# Patient Record
Sex: FEMALE | Race: WHITE | URBAN - METROPOLITAN AREA
[De-identification: names, ages, dates, MRNs, and addresses within clinical notes are randomized per-mention and may not be internally consistent; named-entity substitution may affect disease eponyms.]

---

## 2017-05-03 ENCOUNTER — OFFICE VISIT (OUTPATIENT)
Dept: ORTHOPEDIC SURGERY | Facility: CLINIC | Age: 72
End: 2017-05-03

## 2017-05-03 VITALS
HEART RATE: 67 BPM | BODY MASS INDEX: 27.49 KG/M2 | TEMPERATURE: 97.2 F | WEIGHT: 165 LBS | DIASTOLIC BLOOD PRESSURE: 60 MMHG | HEIGHT: 65 IN | SYSTOLIC BLOOD PRESSURE: 115 MMHG

## 2017-05-03 DIAGNOSIS — Z96.652 STATUS POST TOTAL LEFT KNEE REPLACEMENT: Primary | ICD-10-CM

## 2017-05-03 RX ORDER — WARFARIN 2.5 MG/1
TABLET ORAL
COMMUNITY
Start: 2017-04-05

## 2017-05-03 NOTE — MR AVS SNAPSHOT
Visit Information Date & Time Provider Department Dept. Phone Encounter #  
 5/3/2017  1:00 PM Musa Rockwell, [address] [facility] [facility] [address] [facility] [phone] Upcoming Health Maintenance Date Due Hepatitis C Screening 1945 DTaP/Tdap/Td series (1 - Tdap) 2/13/1968 BREAST CANCER SCRN MAMMOGRAM 2/13/1997 FOBT Q 1 YEAR AGE 50-75 2/13/1997 ZOSTER VACCINE AGE 60> 2/13/2007 GLAUCOMA SCREENING Q2Y 2/13/2012 Pneumococcal 65+ Low/Medium Risk (1 of 2 - PCV13) 2/13/2012 MEDICARE YEARLY EXAM 2/13/2012 INFLUENZA AGE 9 TO ADULT 8/1/2017 Allergies as of 5/3/2017  Review Complete On: 5/3/2017 By: Joel Kelly Severity Noted Reaction Type Reactions Codeine    Not Reported This Time Other Medication    Not Reported This Time Gortex, plastic tape Pcn [Penicillins]    Not Reported This Time Tape [Adhesive]  05/03/2017    Hives Tegaderm Ag Mesh [Silver]    Not Reported This Time Vancomycin    Not Reported This Time Current Immunizations  Never Reviewed No immunizations on file. Not reviewed this visit You Were Diagnosed With   
  
 Codes Comments Status post total left knee replacement    -  Primary ICD-10-CM: C73.489 ICD-9-CM: V43.65 Vitals BP Pulse Temp Height(growth percentile) Weight(growth percentile) BMI  
 115/60 67 97.2 °F (36.2 °C) 5' 5\" (1.651 m) 165 lb (74.8 kg) 27.46 kg/m2 OB Status Smoking Status Hysterectomy Never Smoker Vitals History BMI and BSA Data Body Mass Index Body Surface Area  
 27.46 kg/m 2 1.85 m 2 Preferred Pharmacy Pharmacy Name Phone [id number] [address] [place] [address] Your Updated Medication List  
  
   
This list is accurate as of: 5/3/17  1:50 PM.  Always use your most recent med list.  
  
  
  
  
 albuterol 90 mcg/actuation inhaler Commonly known as:  Elane Heady Take 2 Puffs by inhalation every six (6) hours as needed. CALCIUM 600 PO Take  by mouth.  
  
 carisoprodol 350 mg tablet Commonly known as:  SOMA Take 1 Tab by mouth three (3) times daily. Prn muscle spasms CENTRUM SILVER PO Take  by mouth. CYMBALTA PO Take  by mouth. LIPITOR PO Take  by mouth.  
  
 promethazine 25 mg tablet Commonly known as:  PHENERGAN Take 1 Tab by mouth every six (6) hours as needed for Nausea for 30 days. PROTONIX PO Take  by mouth. SINGULAIR PO Take  by mouth.  
  
 warfarin 2.5 mg tablet Commonly known as:  COUMADIN We Performed the Following AMB POC X-RAY KNEE 3 VIEW [82383 CPT(R)] Patient Instructions You should follow up in 6-8 weeks. If your condition worsens, contact our office. Total Knee Replacement: What to Expect at Home Your Recovery When you leave the hospital, you should be able to move around with a walker or crutches. But you will need someone to help you at home for the next few weeks or until you have more energy and can move around better. If there is no one to help you at home, you may go to a rehabilitation center. You will go home with a bandage and stitches or staples. Change the bandage as your doctor tells you to. Your doctor will remove your stitches or staples 10 to 21 days after your surgery. You may still have some mild pain, and the area may be swollen for 3 to 6 months after surgery. Your knee will continue to improve for 6 to 12 months. You will probably use a walker for 1 to 3 weeks and then use crutches. When you are ready, you can use a cane. You will probably be able to walk on your own in 4 to 8 weeks. You will need to do months of physical rehabilitation (rehab) after a knee replacement. Rehab will help you strengthen the muscles of the knee and help you regain movement.  After you recover, your artificial knee will allow you to do normal daily activities with less pain or no pain at all. You may be able to hike, dance, ride a bike, and play golf. Talk to your doctor about whether you can do more strenuous activities. Always tell your caregivers that you have an artificial knee. How long it will take to walk on your own, return to normal activities, and go back to work depends on your health and how well your rehabilitation (rehab) program goes. The better you do with your rehab exercises, the quicker you will get your strength and movement back. This care sheet gives you a general idea about how long it will take for you to recover. But each person recovers at a different pace. Follow the steps below to get better as quickly as possible. How can you care for yourself at home? Activity · Rest when you feel tired. You may take a nap, but do not stay in bed all day. When you sit, use a chair with arms. You can use the arms to help you stand up. · Work with your physical therapist to find the best way to exercise. You may be able to take frequent, short walks using crutches or a walker. What you can do as your knee heals will depend on whether your new knee is cemented or uncemented. You may not be able to do certain things for a while if your new knee is uncemented. · After your knee has healed enough, you can do more strenuous activities with caution. ¨ You can golf, but use a golf cart, and do not wear shoes with spikes. ¨ You can bike on a flat road or on a stationary bike. Avoid biking up hills. ¨ Your doctor may suggest that you stay away from activities that put stress on your knee. These include tennis or badminton, squash or racquetball, contact sports like football, jumping (such as in basketball), jogging, or running. ¨ Avoid activities where you might fall. These include horseback riding, skiing, and mountain biking. · Do not sit for more than 1 hour at a time.  Get up and walk around for a while before you sit again. If you must sit for a long time, prop up your leg with a chair or footstool. This will help you avoid swelling. · Ask your doctor when you can shower. You may need to take sponge baths until your stitches or staples have been removed. · Ask your doctor when you can drive again. It may take up to 8 weeks after knee replacement surgery before it is safe for you to drive. · When you get into a car, sit on the edge of the seat. Then pull in your legs, and turn to face the front. · You should be able to do many everyday activities 3 to 6 weeks after your surgery. You will probably need to take 4 to 16 weeks off from work. When you can go back to work depends on the type of work you do and how you feel. · Ask your doctor when it is okay for you to have sex. · Do not lift anything heavier than 10 pounds and do not lift weights for 12 weeks. Diet · By the time you leave the hospital, you should be eating your normal diet. If your stomach is upset, try bland, low-fat foods like plain rice, broiled chicken, toast, and yogurt. Your doctor may suggest that you take iron and vitamin supplements. · Drink plenty of fluids (unless your doctor tells you not to). · Eat healthy foods, and watch your portion sizes. Try to stay at your ideal weight. Too much weight puts more stress on your new knee. · You may notice that your bowel movements are not regular right after your surgery. This is common. Try to avoid constipation and straining with bowel movements. You may want to take a fiber supplement every day. If you have not had a bowel movement after a couple of days, ask your doctor about taking a mild laxative. Medicines · Your doctor will tell you if and when you can restart your medicines. He or she will also give you instructions about taking any new medicines.  
· If you take blood thinners, such as warfarin (Coumadin), clopidogrel (Plavix), or aspirin, be sure to talk to your doctor. He or she will tell you if and when to start taking those medicines again. Make sure that you understand exactly what your doctor wants you to do. · Your doctor may give you a blood-thinning medicine to prevent blood clots. If you take a blood thinner, be sure you get instructions about how to take your medicine safely. Blood thinners can cause serious bleeding problems. This medicine could be in pill form or as a shot (injection). If a shot is necessary, your doctor will tell you how to do this. · Be safe with medicines. Take pain medicines exactly as directed. ¨ If the doctor gave you a prescription medicine for pain, take it as prescribed. ¨ If you are not taking a prescription pain medicine, ask your doctor if you can take an over-the-counter medicine. ¨ Plan to take your pain medicine 30 minutes before exercises. It is easier to prevent pain before it starts than to stop it once it has started. · If you think your pain medicine is making you sick to your stomach: 
¨ Take your medicine after meals (unless your doctor has told you not to). ¨ Ask your doctor for a different pain medicine. · If your doctor prescribed antibiotics, take them as directed. Do not stop taking them just because you feel better. You need to take the full course of antibiotics. Incision care · You will have a bandage over the cut (incision) and staples or stitches. Take the bandage off when your doctor says it is okay. · Your doctor will remove the staples or stitches 10 days to 3 weeks after the surgery and replace them with strips of tape. Leave the tape on for a week or until it falls off. Exercise · Your rehab program will give you a number of exercises to do to help you get back your knee's range of motion and strength. Always do them as your therapist tells you. Ice and elevation · For pain and swelling, put ice or a cold pack on the area for 10 to 20 minutes at a time. Put a thin cloth between the ice and your skin. Other instructions · Continue to wear your support stockings as your doctor says. These help to prevent blood clots. The length of time that you will have to wear them depends on your activity level and the amount of swelling. · Wear medical alert jewelry that says you may need antibiotics before any procedure, including dental work. You can buy this at most drugstores. · You have metal pieces in your knee. These may set off some airport metal detectors. Carry a medical alert card that says you have an artificial joint, just in case. Follow-up care is a key part of your treatment and safety. Be sure to make and go to all appointments, and call your doctor if you are having problems. It's also a good idea to know your test results and keep a list of the medicines you take. When should you call for help? Call 911 anytime you think you may need emergency care. For example, call if: 
· You passed out (lost consciousness). · You have severe trouble breathing. · You have sudden chest pain and shortness of breath, or you cough up blood. Call your doctor now or seek immediate medical care if: 
· You have signs of infection, such as: 
¨ Increased pain, swelling, warmth, or redness. ¨ Red streaks leading from the incision. ¨ Pus draining from the incision. ¨ A fever. · You have signs of a blood clot, such as: 
¨ Pain in your calf, back of the knee, thigh, or groin. ¨ Redness and swelling in your leg or groin. · Your incision comes open and begins to bleed, or the bleeding increases. · You have pain that does not get better after you take pain medicine. Watch closely for changes in your health, and be sure to contact your doctor if: 
· You do not have a bowel movement after taking a laxative. Where can you learn more? Go to http://marta-rafael.info/. Enter G431 in the search box to learn more about \"Total Knee Replacement: What to Expect at Home. \" Current as of: August 4, 2016 Content Version: 11.2 © 4355-9795 [employer]. Care instructions adapted under license by [employer] (which disclaims liability or warranty for this information). If you have questions about a medical condition or this instruction, always ask your healthcare professional. [address] any warranty or liability for your use of this information. Introducing [facility]! Dear Antwon Salguero: Thank you for requesting a [employer] account. Our records indicate that you already have an active [employer] account. You can access your account anytime at [email or web address] Did you know that you can access your hospital and ER discharge instructions at any time in [employer]? You can also review all of your test results from your hospital stay or ER visit. Additional Information If you have questions, please visit the Frequently Asked Questions section of the [employer] website at [email or web address] Remember, [employer] is NOT to be used for urgent needs. For medical emergencies, dial 911. Now available from your iPhone and Android! Please provide this summary of care documentation to your next provider. Your primary care clinician is listed as Modesta Schafer. If you have any questions after today's visit, please call [phone].

## 2017-05-03 NOTE — PATIENT INSTRUCTIONS
You should follow up in 6-8 weeks. If your condition worsens, contact our office. Total Knee Replacement: What to Expect at [address] [place] [address]    When you leave the hospital, you should be able to move around with a walker or crutches. But you will need someone to help you at home for the next few weeks or until you have more energy and can move around better. If there is no one to help you at home, you may go to a rehabilitation center. You will go home with a bandage and stitches or staples. Change the bandage as your doctor tells you to. Your doctor will remove your stitches or staples 10 to 21 days after your surgery. You may still have some mild pain, and the area may be swollen for 3 to 6 months after surgery. Your knee will continue to improve for 6 to 12 months. You will probably use a walker for 1 to 3 weeks and then use crutches. When you are ready, you can use a cane. You will probably be able to walk on your own in 4 to 8 weeks. You will need to do months of physical rehabilitation (rehab) after a knee replacement. Rehab will help you strengthen the muscles of the knee and help you regain movement. After you recover, your artificial knee will allow you to do normal daily activities with less pain or no pain at all. You may be able to hike, dance, ride a bike, and play golf. Talk to your doctor about whether you can do more strenuous activities. Always tell your caregivers that you have an artificial knee. How long it will take to walk on your own, return to normal activities, and go back to work depends on your health and how well your rehabilitation (rehab) program goes. The better you do with your rehab exercises, the quicker you will get your strength and movement back. This care sheet gives you a general idea about how long it will take for you to recover. But each person recovers at a different pace. Follow the steps below to get better as quickly as possible.   How can you care for yourself at home? Activity  · Rest when you feel tired. You may take a nap, but do not stay in bed all day. When you sit, use a chair with arms. You can use the arms to help you stand up. · Work with your physical therapist to find the best way to exercise. You may be able to take frequent, short walks using crutches or a walker. What you can do as your knee heals will depend on whether your new knee is cemented or uncemented. You may not be able to do certain things for a while if your new knee is uncemented. · After your knee has healed enough, you can do more strenuous activities with caution. ¨ You can golf, but use a golf cart, and do not wear shoes with spikes. ¨ You can bike on a flat road or on a stationary bike. Avoid biking up hills. ¨ Your doctor may suggest that you stay away from activities that put stress on your knee. These include tennis or badminton, squash or racquetball, contact sports like football, jumping (such as in basketball), jogging, or running. ¨ Avoid activities where you might fall. These include horseback riding, skiing, and mountain biking. · Do not sit for more than 1 hour at a time. Get up and walk around for a while before you sit again. If you must sit for a long time, prop up your leg with a chair or footstool. This will help you avoid swelling. · Ask your doctor when you can shower. You may need to take sponge baths until your stitches or staples have been removed. · Ask your doctor when you can drive again. It may take up to 8 weeks after knee replacement surgery before it is safe for you to drive. · When you get into a car, sit on the edge of the seat. Then pull in your legs, and turn to face the front. · You should be able to do many everyday activities 3 to 6 weeks after your surgery. You will probably need to take 4 to 16 weeks off from work. When you can go back to work depends on the type of work you do and how you feel.   · Ask your doctor when it is okay for you to have sex. · Do not lift anything heavier than 10 pounds and do not lift weights for 12 weeks. Diet  · By the time you leave the hospital, you should be eating your normal diet. If your stomach is upset, try bland, low-fat foods like plain rice, broiled chicken, toast, and yogurt. Your doctor may suggest that you take iron and vitamin supplements. · Drink plenty of fluids (unless your doctor tells you not to). · Eat healthy foods, and watch your portion sizes. Try to stay at your ideal weight. Too much weight puts more stress on your new knee. · You may notice that your bowel movements are not regular right after your surgery. This is common. Try to avoid constipation and straining with bowel movements. You may want to take a fiber supplement every day. If you have not had a bowel movement after a couple of days, ask your doctor about taking a mild laxative. Medicines  · Your doctor will tell you if and when you can restart your medicines. He or she will also give you instructions about taking any new medicines. · If you take blood thinners, such as warfarin (Coumadin), clopidogrel (Plavix), or aspirin, be sure to talk to your doctor. He or she will tell you if and when to start taking those medicines again. Make sure that you understand exactly what your doctor wants you to do. · Your doctor may give you a blood-thinning medicine to prevent blood clots. If you take a blood thinner, be sure you get instructions about how to take your medicine safely. Blood thinners can cause serious bleeding problems. This medicine could be in pill form or as a shot (injection). If a shot is necessary, your doctor will tell you how to do this. · Be safe with medicines. Take pain medicines exactly as directed. ¨ If the doctor gave you a prescription medicine for pain, take it as prescribed. ¨ If you are not taking a prescription pain medicine, ask your doctor if you can take an over-the-counter medicine.   ¨ Plan to take your pain medicine 30 minutes before exercises. It is easier to prevent pain before it starts than to stop it once it has started. · If you think your pain medicine is making you sick to your stomach:  ¨ Take your medicine after meals (unless your doctor has told you not to). ¨ Ask your doctor for a different pain medicine. · If your doctor prescribed antibiotics, take them as directed. Do not stop taking them just because you feel better. You need to take the full course of antibiotics. Incision care  · You will have a bandage over the cut (incision) and staples or stitches. Take the bandage off when your doctor says it is okay. · Your doctor will remove the staples or stitches 10 days to 3 weeks after the surgery and replace them with strips of tape. Leave the tape on for a week or until it falls off. Exercise  · Your rehab program will give you a number of exercises to do to help you get back your knee's range of motion and strength. Always do them as your therapist tells you. Ice and elevation  · For pain and swelling, put ice or a cold pack on the area for 10 to 20 minutes at a time. Put a thin cloth between the ice and your skin. Other instructions  · Continue to wear your support stockings as your doctor says. These help to prevent blood clots. The length of time that you will have to wear them depends on your activity level and the amount of swelling. · Wear medical alert jewelry that says you may need antibiotics before any procedure, including dental work. You can buy this at most drugstores. · You have metal pieces in your knee. These may set off some airport metal detectors. Carry a medical alert card that says you have an artificial joint, just in case. Follow-up care is a key part of your treatment and safety. Be sure to make and go to all appointments, and call your doctor if you are having problems.  It's also a good idea to know your test results and keep a list of the medicines you take.  When should you call for help? Call 911 anytime you think you may need emergency care. For example, call if:  · You passed out (lost consciousness). · You have severe trouble breathing. · You have sudden chest pain and shortness of breath, or you cough up blood. Call your doctor now or seek immediate medical care if:  · You have signs of infection, such as:  ¨ Increased pain, swelling, warmth, or redness. ¨ Red streaks leading from the incision. ¨ Pus draining from the incision. ¨ A fever. · You have signs of a blood clot, such as:  ¨ Pain in your calf, back of the knee, thigh, or groin. ¨ Redness and swelling in your leg or groin. · Your incision comes open and begins to bleed, or the bleeding increases. · You have pain that does not get better after you take pain medicine. Watch closely for changes in your health, and be sure to contact your doctor if:  · You do not have a bowel movement after taking a laxative. Where can you learn more? Go to http://marta-rafael.info/. Enter G026 in the search box to learn more about \"Total Knee Replacement: What to Expect at Home. \"  Current as of: August 4, 2016  Content Version: 11.2  © 0167-7036 [employer]. Care instructions adapted under license by [employer] (which disclaims liability or warranty for this information). If you have questions about a medical condition or this instruction, always ask your healthcare professional. [place] [address] any warranty or liability for your use of this information.

## 2017-05-03 NOTE — PROGRESS NOTES
Patient: Monalisa Ruiz                MRN: [id number]       SSN: [id number]  YOB: 1945        AGE: 81 y.o. SEX: female  Body mass index is 27.46 kg/(m^2). PCP: Zach Teran MD  05/03/17    HISTORY: I had the pleasure of reviewing Ms. Keys as a second opinion with regards to her left knee. She is roughly about six months out. She has been well worked up for infection, which is negative. She has had a combination bone scan, which I interpret as her having still some healing in the knee itself. She has pain at rest and pain with all activities. She has known fibromyalgia, and I think this is a significant component. She just went to pain management, and they are going to have a compounded cream for her, which I think is an excellent idea. There were really no complications after surgery other than a failed nerve block, no infections or blood clots and no other surgeries. She has a Cherie II knee replacement. The complaints of pain are significant, especially concerning is significant pain at rest.  Even the blankets from her bed bother her knee, and she tends to sleep in the recliner chair. PHYSICAL EXAMINATION:  She is examined with her  present. On examination today, she does walk with a mildly antalgic gait owing to the leg. She tends to hold it stiff when she ambulates. She has no true start-up discomfort, however. She has a minimally positive movie theater sign. She moves her head and neck adequately. The hips rotate nicely. She has a nicely healed incision. The knee is slightly warm but not unexpected for this period of time after surgery. The knee itself is stable. She has a touch of a patella rub with terminal extension but not severely so and just globalized tenderness well away from the knee replacement, as well as over the knee replacement as well. The knee is quite stable. The calf is nontender. Herminia's sign is negative.   She is neurologically intact. There is no cyanosis, peripheral edema, or clubbing. RADIOGRAPHS:  I did review her x-rays. She has a well-aligned and well-fixed Cherie II knee replacement in good position and alignment. I note just slight patellar tilt on the skyline but not severely so. IMPRESSION:  My overall impression is a knee replacement that is still healing and a little bit stiff with significant fibromyalgia and also an incomplete RSD syndrome. PLAN:  At this point, I would recommend that she try to bend the knee a little bit more herself on an hourly basis, and I have demonstrated how to cross her ankles to sit with the leg out straight and to give a generous massage several times a day to desensitize the skin. I would not recommend any surgery at this point in time. I think careful follow up is important. I reassured her that all knee replacement do click to some degree. I do not think any surgery is indicated, and I do not think, at this point, lateral release will be efficacious for her at this time. I would just recommend careful observation, and I do expect with the good pain management, the massage, and working on the range of motion, I think she will have a good result. I think things are just going to take another six months or so to heal up. REVIEW OF SYSTEMS:      CON: negative for weight loss, fever  EYE: negative for double vision  ENT: negative for hoarseness  RS:   negative for Tb  GI:    negative for blood in stool  :  negative for blood in urine  Other systems reviewed and noted below.           Past Medical History:   Diagnosis Date    Anticardiolipin antibody syndrome ([facility] [address]     Asthma     Cancer ([facility] [address] 2002    ovarian    Chemotherapy     Degenerative disc disease, cervical     Degenerative disc disease, lumbar     Fibromyalgia syndrome     Grave's disease     Herniated intervertebral disk     C5-C6    History of blood clotting disorder 4/2009 [facility]    HX OTHER MEDICAL     chemo related hypothyroid (resolved)    Osteoarthritis     RLS (restless legs syndrome)     Sleep apnea 2012    Stroke [facility]) 2000/2009       Family History   Problem Relation Age of Onset    Diabetes Maternal Aunt        Current Outpatient Prescriptions   Medication Sig Dispense Refill    warfarin (COUMADIN) 2.5 mg tablet       carisoprodol (SOMA) 350 mg tablet Take 1 Tab by mouth three (3) times daily. Prn muscle spasms 90 Tab 2    PANTOPRAZOLE SODIUM (PROTONIX PO) Take  by mouth.  MONTELUKAST SODIUM (SINGULAIR PO) Take  by mouth.  ATORVASTATIN CALCIUM (LIPITOR PO) Take  by mouth.  MULTIVITAMIN W-MINERALS/LUTEIN (CENTRUM SILVER PO) Take  by mouth.  DULOXETINE HCL (CYMBALTA PO) Take  by mouth.  CALCIUM CARBONATE (CALCIUM 600 PO) Take  by mouth.  albuterol (PROVENTIL, VENTOLIN) 90 mcg/Actuation inhaler Take 2 Puffs by inhalation every six (6) hours as needed.  promethazine (PHENERGAN) 25 mg tablet Take 1 Tab by mouth every six (6) hours as needed for Nausea for 30 days.  100 Tab 2       Allergies   Allergen Reactions    Codeine Not Reported This Time    Other Medication Not Reported This Time     Gortex, plastic tape    Pcn [Penicillins] Not Reported This Time    Tape [Adhesive] Hives    Tegaderm Ag Mesh [Silver] Not Reported This Time    Vancomycin Not Reported This Time       Past Surgical History:   Procedure Laterality Date    CARDIAC SURG PROCEDURE UNLIST      CYSTOSCOPY  2002    with stent/ r    HAND/FINGER SURGERY UNLISTED      HX APPENDECTOMY  1962    HX BACK SURGERY  2009    Anterior Cervical Corpectomy C5-6    HX BREAST RECONSTRUCTION  1986-87    HX CATARACT REMOVAL  2009    bilateral    HX CERVICAL FUSION      HX CHOLECYSTECTOMY  1979    HX COLECTOMY  2002    WITH OVARIAN MASS    HX GYN  1994, 1995    ovary laparoscopy    HX HEENT  1994, 1998, 2004    esophagus dialation    HX HEMORRHOIDECTOMY  1993    HX HERNIA REPAIR  1997    with gortex- hernia repair rejected gortex    HX HERNIA REPAIR  2004    HX HYSTERECTOMY  1975    HX KNEE ARTHROSCOPY      HX KNEE REPLACEMENT      HX MASTECTOMY  1986    bilateral    HX ORTHOPAEDIC  1995    right ankle break with metal and screws    HX ORTHOPAEDIC  2007    removal of screws/metal from ankle    [address]    common bile duct    HX OTHER SURGICAL      cancer staging    HX OTHER SURGICAL  2003/2004    HMP/Vascular Access [facility]is TONSILLECTOMY  1958    HX VERTEBROPLASTY  2009    T5    LAP,LYSIS OF ADHESIONS  2005       Social History     Social History    Marital status:      Spouse name: N/A    Number of children: N/A    Years of education: N/A     Occupational History    Not on file. Social History Main Topics    Smoking status: Never Smoker    Smokeless tobacco: Not on file    Alcohol use No    Drug use: No    Sexual activity: Not on file     Other Topics Concern    Not on file     Social History Narrative       Visit Vitals    /60    Pulse 67    Temp 97.2 °F (36.2 °C)    Ht 5' 5\" (1.651 m)    Wt 165 lb (74.8 kg)    BMI 27.46 kg/m2         PHYSICAL EXAMINATION:  GENERAL: Alert and oriented x3, in no acute distress, well-developed, well-nourished, afebrile. HEART: No JVD. EYES: No scleral icterus   NECK: No significant lymphadenopathy   LUNGS: No respiratory compromise or indrawing  ABDOMEN: Soft, non-tender, non-distended. Electronically signed by:  Eden Foster MD

## 2018-08-27 PROBLEM — R32 URINARY INCONTINENCE: Status: ACTIVE | Noted: 2018-08-27
